# Patient Record
Sex: FEMALE | Race: WHITE
[De-identification: names, ages, dates, MRNs, and addresses within clinical notes are randomized per-mention and may not be internally consistent; named-entity substitution may affect disease eponyms.]

---

## 2018-01-08 ENCOUNTER — HOSPITAL ENCOUNTER (OUTPATIENT)
Dept: HOSPITAL 56 - MW.SDS | Age: 63
LOS: 1 days | Discharge: HOME | End: 2018-01-09
Attending: OBSTETRICS & GYNECOLOGY
Payer: COMMERCIAL

## 2018-01-08 DIAGNOSIS — D25.1: ICD-10-CM

## 2018-01-08 DIAGNOSIS — R11.0: ICD-10-CM

## 2018-01-08 DIAGNOSIS — E03.9: ICD-10-CM

## 2018-01-08 DIAGNOSIS — Z88.8: ICD-10-CM

## 2018-01-08 DIAGNOSIS — Z98.890: ICD-10-CM

## 2018-01-08 DIAGNOSIS — N72: ICD-10-CM

## 2018-01-08 DIAGNOSIS — Z87.442: ICD-10-CM

## 2018-01-08 DIAGNOSIS — Z79.899: ICD-10-CM

## 2018-01-08 DIAGNOSIS — Z90.89: ICD-10-CM

## 2018-01-08 DIAGNOSIS — Z88.2: ICD-10-CM

## 2018-01-08 DIAGNOSIS — K21.9: ICD-10-CM

## 2018-01-08 DIAGNOSIS — N81.2: Primary | ICD-10-CM

## 2018-01-08 LAB
CHLORIDE SERPL-SCNC: 108 MMOL/L (ref 98–110)
SODIUM SERPL-SCNC: 142 MMOL/L (ref 136–146)

## 2018-01-08 PROCEDURE — 80048 BASIC METABOLIC PNL TOTAL CA: CPT

## 2018-01-08 PROCEDURE — 86901 BLOOD TYPING SEROLOGIC RH(D): CPT

## 2018-01-08 PROCEDURE — 86900 BLOOD TYPING SEROLOGIC ABO: CPT

## 2018-01-08 PROCEDURE — 86850 RBC ANTIBODY SCREEN: CPT

## 2018-01-08 PROCEDURE — 85027 COMPLETE CBC AUTOMATED: CPT

## 2018-01-08 PROCEDURE — 58262 VAG HYST INCLUDING T/O: CPT

## 2018-01-08 PROCEDURE — 57200 REPAIR OF VAGINA: CPT

## 2018-01-08 PROCEDURE — 36415 COLL VENOUS BLD VENIPUNCTURE: CPT

## 2018-01-08 PROCEDURE — 85025 COMPLETE CBC W/AUTO DIFF WBC: CPT

## 2018-01-08 RX ADMIN — PROMETHAZINE HYDROCHLORIDE PRN MG: 25 INJECTION INTRAMUSCULAR; INTRAVENOUS at 15:04

## 2018-01-08 RX ADMIN — PROMETHAZINE HYDROCHLORIDE PRN MG: 25 INJECTION INTRAMUSCULAR; INTRAVENOUS at 22:45

## 2018-01-08 NOTE — PCM.OPNOTE
- General Post-Op/Procedure Note


Date of Surgery/Procedure: 01/08/18


Operative Procedure(s): TVH/BSO/A&P repair/cystoscopy


Findings: 





8 week uterus, normal appearing tubes/ovaries, bilateral patent ureters


Pre Op Diagnosis: Incomplete uterovaginal prolapse


Post-Op Diagnosis: Same


Anesthesia Technique: General ET Tube


Primary Surgeon: Shirley Fleming


Assistant: Lacey Daniels


Pathology: 





uterus, tubes, ovaries, vaginal mucosa


Fluid Replacement, Intraop: 2,700


EBL in mLs: 350


Complications: none known


Condition: Good


Free Text/Narrative:: 


 Intake & Output











 01/07/18 01/08/18 01/08/18





 22:59 06:59 14:59


 


Output Total   475


 


Balance   -475








Dictation  [id number]

## 2018-01-08 NOTE — PCM.PREANE
Preanesthetic Assessment





- Anesthesia/Transfusion/Family Hx


Anesthesia History: Prior Anesthesia Without Reaction


Family History of Anesthesia Reaction: No


Transfusion History: No Prior Transfusion(s)


Intubation History: Unknown





- Review of Systems


General: No Symptoms


Pulmonary: No Symptoms


Cardiovascular: No Symptoms


Gastrointestinal: No Symptoms


Neurological: No Symptoms


Other: Reports: None





- Physical Assessment


O2 Sat by Pulse Oximetry: 99


Respiratory Rate: 16


Vital Signs: 





 Last Vital Signs











Temp  36.5 C   18 06:57


 


Pulse  84   18 06:57


 


Resp  16   18 06:57


 


BP  130/62   18 06:57


 


Pulse Ox  99   18 06:57











Height: 1.65 m


Weight: 69.4 kg


ASA Class: 2


Mental Status: Alert & Oriented x3


Airway Class: Mallampati = 2


Dentition: Reports: Normal Dentition


Thyro-Mental Finger Breadths: 2


Mouth Opening Finger Breadths: 3


ROM/Head Extension: Full


Lungs: Clear to Auscultation, Normal Respiratory Effort


Cardiovascular: Regular Rate, Regular Rhythm





- Allergies


Allergies/Adverse Reactions: 


 Allergies











Allergy/AdvReac Type Severity Reaction Status Date / Time


 


propoxyphene [From Darvon] Allergy  Dizziness Verified 18 06:48


 


Sulfa (Sulfonamide Allergy  Rash Verified 18 06:48





Antibiotics)     














- Blood


Blood Available: No





- Anesthesia Plan


Pre-Op Medication Ordered: None





- Acknowledgements


Anesthesia Type Planned: General Anesthesia


Pt an Appropriate Candidate for the Planned Anesthesia: Yes


Alternatives and Risks of Anesthesia Discussed w Pt/Guardian: Yes


Pt/Guardian Understands and Agrees with Anesthesia Plan: Yes





PreAnesthesia Questionnaire


HEENT History: Reports: Cataract


Other HEENT History: wears glasses


Gastrointestinal History: Reports: GERD


Genitourinary History: Reports: Renal Calculus


OB/GYN History: Reports: Pregnancy, Spontaneous 


Musculoskeletal History: Reports: Fibromyalgia


Other Musculoskeletal History: osteopenia,  hx of fx right ankle


Endocrine/Metabolic History: Reports: Hypothyroidism





- Past Surgical History


HEENT Surgical History: Reports: Cataract Surgery, Retinal, Tonsillectomy


Other HEENT Surgeries/Procedures: retinal repair-right eye


Respiratory Surgical History: Reports: Lung Biopsies


Other Respiratory Surgeries/Procedures: non-malignant nodule removed from lung


GI Surgical History: Reports: Colonoscopy


Female  Surgical History: Reports: Lithotripsy/ESWL





- SUBSTANCE USE


Smoking Status *Q: Never Smoker


Recreational Drug Use History: No





- HOME MEDS


Home Medications: 


 Home Meds





Amitriptyline [Elavil] 10 mg PO BEDTIME 18 [History]


Cholecalciferol (Vitamin D3) [Vitamin D3] 2,000 unit PO BID 18 [History]


Estradiol [Yuvafem] 10 mcg VAG ASDIRECTED 18 [History]


Levothyroxine [Synthroid] 50 mcg PO QAM 18 [History]


Mv-Min/Iron/Folic/Calcium/Vitk [Women's Daily Formula Tablet] 1 tab PO DAILY  [History]


Pantoprazole Sodium [Protonix] 40 mg PO Q48H 18 [History]


Red Yeast Rice 1 cap PO DAILY 18 [History]











- CURRENT (IN HOUSE) MEDS


Current Meds: 





 Current Medications





Sodium Chloride (Saline Flush)  10 ml FLUSH ASDIRECTED PRN


   PRN Reason: Keep Vein Open


Sodium Chloride (Saline Flush)  2.5 ml FLUSH ASDIRECTED PRN


   PRN Reason: Keep Vein Open





Discontinued Medications





Cefazolin Sodium/Dextrose 1 gm (/ Premix)  50 mls @ 100 mls/hr IV ONETIME ONE


   Stop: 18 05:29

## 2018-01-08 NOTE — OR
SURGEON:

Shirley Fleming M.D.

 

DATE OF PROCEDURE:  01/08/2018

 

PREOPERATIVE DIAGNOSIS:

Incomplete uterovaginal prolapse.

 

POSTOPERATIVE DIAGNOSIS:

Incomplete uterovaginal prolapse.

 

PROCEDURE:

1. Total vaginal hysterectomy.

2. Bilateral salpingo-oophorectomy.

3. Anterior and posterior colporrhaphy with cystoscopy.

 

PRIMARY SURGEON:

Shirley Fleming M.D.

 

ASSISTANT:

Lacey Daniels M.D.

 

ANESTHESIA:

General endotracheal anesthesia.

 

FLUIDS:

2700 mL crystalloid.

 

ESTIMATED BLOOD LOSS:

350 mL.

 

FINDINGS:

Approximately 8-week size uterus.  Normal-appearing tubes and ovaries.

Bilateral patent ureters with visualization with cystoscopy at the end of the

case.

 

INDICATIONS:

Marie is a 62-year-old postmenopausal female, who has had ongoing difficulties

with pelvic organ prolapse.  At this time, she would like to proceed with

surgical intervention.  Risks of procedure have been discussed and proper

consent obtained.

 

PROCEDURE IN DETAIL:

The patient was taken to the operating room, where she underwent general

endotracheal anesthesia and was placed in a modified dorsal lithotomy position

and prepped and draped in the usual sterile fashion.  SCDs to the lower

extremities.  Mcrae to gravity.  She received Ancef prophylactically.  Time-out

was performed.

 

A weighted speculum was placed in the vagina, anterior [place].  Cervix grasped

with Allison clamp.  The cervix was circumscribed with Bovie cautery.  The

anterior and posterior overlying mucosa was dissected away from the underlying

peritoneum.  The peritoneum was tented downwards posteriorly and entered

sharply.  A longer weighted speculum was replaced with the shorter anteriorly.

Careful dissection was performed bluntly and sharply as the anterior cul-de-sac

was entered.  Alberto now mobilized the bladder away from the operative field.

 

Marisa clamps were utilized to secure the uterosacral ligament on either side.

Pedicle was transected and suture ligated with 2-0 Vicryl.  The remaining

pedicles along either side of the uterus were able to be secured, transected,

and suture ligated up to the level of the utero-tubo-ovarian pedicle which was

secured, transected, and suture ligated.  Specimens were passed off to scrub

tech to be sent to pathology.

 

Photographs were taken as the patient had requested.

 

The right fallopian tube and ovary were able to be identified, grasped with

[place] clamp, and the infundibulopelvic ligament was able to be secured with

Marisa clamp x2, transected, and suture ligated with suture x2.  Similar fashion

was performed on the patient's right side.  A small pedicle of tissue that was

still adherent was able to be grasped with a LigaSure, transected, and suture

ligated.

 

We inspected the pedicles closely.  There was an area of bleeding along the

right IFP that was secured with a suture.  Hemostasis was thereafter evident.

 

Remainder of the pedicles appeared hemostatic.

 

The vaginal apex on either side was secured to the uterosacral ligament on

either side in order to help prevent enterocele to form in the future.  The

culdoplasty was performed posteriorly beginning at the left uterosacral

ligament, reefing the posterior peritoneum, and incorporating the right

uterosacral ligament.  In the same fashion was performed one step more cephalad

using the right uterosacral ligament, reefing the posterior peritoneum, and

exiting through the left uterosacral ligament.

 

These will be tied down at the end of the case in order to help with

visualization for the remainder of the case.

 

Attention was now turned to performing the anterior repair.  The midline

anterior vaginal mucosa was grasped on either side with Allis clamps.  The

region was hydrodissected and then a sagittal midline vaginal mucosal incision

was created using Metzenbaum scissors.  The edges of the mucosa were grasped on

either side with serial Allis clamps.  The overlying mucosa was dissected

sharply and bluntly from the muscularis tissue until stronger muscularis tissue

was able to be identified on either side.  The stronger muscularis tissue was

now plicated using 2-0 Vicryl with inverted mattress suture technique.  The

excess mucosa was trimmed, and the mucosa was closed using 0 Vicryl in a

continuous running locked fashion down the level of the cuff.  Posteriorly, the

posterior defect was able to be identified.  The Allis clamps were secured on

either side of the introitus at 5 and 7 o'clock.  An inverted-V wedge section of

tissue was able to be excised using a #15 blade scalpel in order to help perform

perineorrhaphy.

 

In the midline posterior mucosa, hydrodissection was performed.  The Metzenbaum

scissor was now utilized to create a sagittal midline incision.  The edges of

the mucosa were now grasped in a serial fashion with Allis clamps, and overlying

mucosa was dissected from underlying muscularis in a similar fashion to the

anterior.  The stronger muscularis tissue was able to be identified laterally on

each side and re-plicated using 2-0 Vicryl with inverted mattress suture

technique.  There was 1 perforating vessel bleeding along the left side, secured

with a suture, and hemostasis was thereafter evident.

 

Excess vaginal mucosa was now trimmed.  The perineorrhaphy was repaired using 2-

0 Vicryl bringing together the subcutaneous tissue within and closing the skin

in a subcuticular fashion.  The remainder of the cuff was now closed using 0

Vicryl in a continuous running locked fashion after tying down the Álvarez

culdoplasty 2 sutures which did occlude the cul-de-sac nicely.

 

These sutures were trimmed as the cuff continued to be closed.  The Anesthesia

delivered IV fluorescein and Lasix.  The cuff was now inspected and was found to

be hemostatic.  Mcrae catheter was removed after the balloon was desufflated.

The cystoscope was introduced using normal saline as distention media.  Able to

visualize dome of the bladder, followed by the trigone.  The right ureteral

orifice followed by the left ureteral orifice was able to be visualized.

Fluorescein dyed urine was seen streaming from them helping to ensure ureteral

patency.  The cystoscope was removed.  The bladder was drained.  Mcrae catheter

was replaced.  The vaginal cuff was once again inspected and found to be

hemostatic.  The vaginal cuff was now packed with a packing coated with KY.

Packing was trimmed.  The patient tolerated the procedure well.  She will go to

PACU in stable condition.  Sponge and needle count was correct x2.  Specimens to

pathology.  Once again, we took photographs of the uterus and tubes and ovaries

per the patient request.

 

 

ABRAM / SHWETHA

DD:  01/08/2018 12:02:58

DT:  01/08/2018 18:38:09

Job #:  [id number]

## 2018-01-09 LAB
CHLORIDE SERPL-SCNC: 106 MMOL/L (ref 98–110)
SODIUM SERPL-SCNC: 139 MMOL/L (ref 136–146)

## 2018-01-09 RX ADMIN — PROMETHAZINE HYDROCHLORIDE PRN MG: 25 INJECTION INTRAMUSCULAR; INTRAVENOUS at 05:07

## 2018-01-09 NOTE — [id number]
Post Anesthesia Note





- EVALUATION WITHIN 48HRS OF ANESTHETIC


Vital Signs in Normal Range: Yes


Patient Participated in Evaluation: Yes


Respiratory Function Stable: Yes


Airway Patent: Yes


Cardiovascular Function Stable: Yes


Hydration Status Stable: Yes


Pain Control Satisfactory: Yes


Nausea and Vomiting Control Satisfactory: No (sleeping now but had nausea 

phenergan and scop patch given)


Mental Status Recovered: Yes

## 2018-01-09 NOTE — PCM.SURGPN
- General Info


Date of Service: 01/09/18


POD#: 1


Functional Status: Reports: Pain Controlled, Tolerating Diet, Ambulating





- Review of Systems


General: Denies: Fever, Weakness


Pulmonary: Denies: Shortness of Breath


Cardiovascular: Denies: Chest Pain, Palpitations, Lightheadedness


Gastrointestinal: Reports: Nausea (improved this am, ate breakfast).  Denies: 

Diarrhea


Genitourinary: Denies: Flank Pain


Psychiatric: Reports: No Symptoms





- Patient Data


Vitals - Most Recent: 


 Last Vital Signs











Temp  37.2 C   01/09/18 08:00


 


Pulse  98   01/09/18 08:00


 


Resp  18   01/09/18 08:00


 


BP  150/73 H  01/09/18 08:00


 


Pulse Ox  96   01/09/18 08:00











Weight - Most Recent: 69.4 kg


I&O - Last 24 Hours: 


 Intake & Output











 01/08/18 01/09/18 01/09/18





 22:59 06:59 14:59


 


Intake Total 389 1825 


 


Output Total 550 1900 


 


Balance -161 -75 











Lab Results Last 24 Hrs: 


 Laboratory Results - last 24 hr











  01/08/18 01/09/18 01/09/18 Range/Units





  07:12 04:52 04:52 


 


WBC   11.62 H   (4.0-11.0)  K/uL


 


RBC   4.01 L   (4.30-5.90)  M/uL


 


Hgb   12.2   (12.0-16.0)  g/dL


 


Hct   35.4 L   (36.0-46.0)  %


 


MCV   88.3   (80.0-98.0)  fL


 


MCH   30.4   (27.0-32.0)  pg


 


MCHC   34.5   (31.0-37.0)  g/dL


 


RDW Std Deviation   44.4   (28.0-62.0)  fl


 


RDW Coeff of [place]   14   (11.0-15.0)  %


 


Plt Count   279   (150-400)  K/uL


 


MPV   10.40   (7.40-12.00)  fL


 


Neut % (Auto)   79.8   (48.0-80.0)  %


 


Lymph % (Auto)   12.3 L   (16.0-40.0)  %


 


Mono % (Auto)   7.9   (0.0-15.0)  %


 


Eos % (Auto)   0.0   (0.0-7.0)  %


 


Baso % (Auto)   0.0   (0.0-1.5)  %


 


Neut # (Auto)   9.3 H   (1.4-5.7)  K/uL


 


Lymph # (Auto)   1.4   (0.6-2.4)  K/uL


 


Mono # (Auto)   0.9 H   (0.0-0.8)  K/uL


 


Eos # (Auto)   0.0   (0.0-0.7)  K/uL


 


Baso # (Auto)   0.0   (0.0-0.1)  K/uL


 


Nucleated RBC %   0.0   /100WBC


 


Nucleated RBCs #   0   K/uL


 


Sodium    139  (136-146)  mmol/L


 


Potassium    3.4 L  (3.5-5.1)  mmol/L


 


Chloride    106  ()  mmol/L


 


Carbon Dioxide    24  (21-31)  mmol/L


 


BUN    7  (6.0-23.0)  mg/dL


 


Creatinine    0.7  (0.6-1.5)  mg/dL


 


Est Cr Clr Drug Dosing    74.98  mL/min


 


Estimated GFR (MDRD)    > 60.0  ml/min


 


Glucose    115 H  ()  mg/dL


 


Calcium    8.4 L  (8.8-10.8)  mg/dL


 


Blood Type  A POSITIVE    


 


Antibody Screen  NEGATIVE    











Med Orders - Current: 


 Current Medications





Al Hydroxide/Mg Hydroxide (Mag-Al Plus)  30 ml PO Q4H PRN


   PRN Reason: Indigestion


Belladonna Alkaloids/Opium (B & O Supprettes No. 15a)  1 supp RECTAL Q4H PRN


   PRN Reason: Pain


Docusate Sodium (Colace)  100 mg PO BID [facility]


   Last Admin: 01/08/18 23:16 Dose:  100 mg


Fentanyl (Sublimaze)  50 mcg IVPUSH Q5M PRN


   PRN Reason: Pain (severe 7-10)


   Stop: 01/09/18 11:02


Dextrose/Sodium Chloride (Dextrose 5%-1/2 Ns)  1,000 mls @ 125 mls/hr IV 

ASDIRECTED [facility]


   Last Admin: 01/09/18 07:11 Dose:  125 mls/hr


Ketorolac Tromethamine (Toradol)  30 mg IVPUSH Q6H PRN


   PRN Reason: Pain (severe 7-10)


   Stop: 01/13/18 11:50


Morphine Sulfate (Morphine)  2 mg IVPUSH Q2H PRN


   PRN Reason: Pain (severe 7-10)


Morphine Sulfate (Morphine)  4 mg IVPUSH Q2H PRN


   PRN Reason: Pain (severe 7-10)


Ondansetron HCl (Zofran)  4 mg IVPUSH Q6H PRN


   PRN Reason: Nausea/Vomiting


   Last Admin: 01/08/18 17:14 Dose:  4 mg


Oxycodone/Acetaminophen (Percocet 325-5 Mg)  1 tab PO Q4H PRN


   PRN Reason: Pain (moderate 4-6)


Oxycodone/Acetaminophen (Percocet 325-5 Mg)  2 tab PO Q4H PRN


   PRN Reason: Pain (moderate 4-6)


Promethazine HCl (Phenergan)  25 mg IM Q6H PRN


   PRN Reason: Nausea/Vomiting


   Last Admin: 01/09/18 05:07 Dose:  25 mg


Scopolamine (Transderm-Scop)  1.5 mg TRDERM Q72H PRN


   PRN Reason: Nausea


   Last Admin: 01/08/18 17:52 Dose:  1.5 mg


Sodium Chloride (Saline Flush)  10 ml FLUSH ASDIRECTED PRN


   PRN Reason: Keep Vein Open


Sodium Chloride (Saline Flush)  2.5 ml FLUSH ASDIRECTED PRN


   PRN Reason: Keep Vein Open





Discontinued Medications





Dexamethasone (Dexamethasone) Confirm Administered Dose 20 mg .ROUTE .STK-MED 

ONE


   Stop: 01/08/18 07:55


Diphenhydramine HCl (Benadryl) Confirm Administered Dose 50 mg .ROUTE .STK-MED 

ONE


   Stop: 01/08/18 07:55


Fentanyl (Sublimaze) Confirm Administered Dose 250 mcg .ROUTE .STK-MED ONE


   Stop: 01/08/18 07:55


Fluorescein Sodium (Ak-Fluor) Confirm Administered Dose 5 ml .ROUTE .STK-MED ONE


   Stop: 01/08/18 10:20


Furosemide (Lasix) Confirm Administered Dose 40 mg .ROUTE .STK-MED ONE


   Stop: 01/08/18 10:20


Glycopyrrolate () Confirm Administered Dose 1 mg .ROUTE .STK-MED ONE


   Stop: 01/08/18 11:34


Hydromorphone HCl (Dilaudid) Confirm Administered Dose 2 mg .ROUTE .STK-MED ONE


   Stop: 01/08/18 10:18


Hydromorphone HCl (Dilaudid)  0 mg IVPUSH ONETIME ONE


   Stop: 01/08/18 11:04


   Last Admin: 01/08/18 14:24 Dose:  Not Given


Cefazolin Sodium/Dextrose 1 gm (/ Premix)  50 mls @ 100 mls/hr IV ONETIME ONE


   Stop: 01/08/18 05:29


   Last Admin: 01/08/18 14:24 Dose:  Not Given


Acetaminophen (Ofirmev) Confirm Administered Dose 100 mls @ as directed IV .STK-

MED ONE


   Stop: 01/08/18 07:56


Cefazolin Sodium/Dextrose (Ancef) Confirm Administered Dose 50 mls @ as 

directed .ROUTE .STK-MED ONE


   Stop: 01/08/18 09:55


Ketorolac Tromethamine (Toradol)  30 mg IVPUSH ONETIME ONE


   Stop: 01/08/18 11:51


   Last Admin: 01/08/18 14:25 Dose:  Not Given


Midazolam HCl (Versed 1 Mg/Ml) Confirm Administered Dose 2 mg .ROUTE .STK-MED 

ONE


   Stop: 01/08/18 07:55


Neostigmine Methylsulfate (Neostigmine) Confirm Administered Dose 5 mg .ROUTE 

.STK-MED ONE


   Stop: 01/08/18 11:34


Ondansetron HCl (Zofran) Confirm Administered Dose 4 mg .ROUTE .STK-MED ONE


   Stop: 01/08/18 07:55


Propofol (Diprivan  20 Ml) Confirm Administered Dose 200 mg .ROUTE .STK-MED ONE


   Stop: 01/08/18 07:55


Rocuronium Bromide (Zemuron) Confirm Administered Dose 100 mg .ROUTE .STK-MED 

ONE


   Stop: 01/08/18 07:55











- Exam


General: Alert, Oriented


Lungs: Normal Respiratory Effort


Cardiovascular: Regular Rate, Regular Rhythm


GI/Abdominal Exam: Soft, Non-Tender.  No: Guarding, Rigid


Extremities: No: Genaro's Sign


Psy/Mental Status: Alert, Normal Affect





- Problem List & Annotations


(1) Incomplete uterovaginal prolapse


SNOMED Code(s): 040999732


   Code(s): N81.2 - INCOMPLETE UTEROVAGINAL PROLAPSE   Status: Acute   Current 

Visit: Yes   





- Problem List Review


Problem List Initiated/Reviewed/Updated: Yes





- My Orders


Last 24 Hours: 


 Active Orders 24 hr











 Category Date Time Status


 


 Patient Status [ADT] Routine ADT  01/08/18 11:50 Active


 


 Antiembolic Devices [RC] Q4H Care  01/08/18 11:50 Active


 


 Notify Provider Intake and Out [RC] ASDIRECTED Care  01/08/18 11:50 Active


 


 Notify Provider Vital Signs [RC] ASDIRECTED Care  01/08/18 11:50 Active


 


 Oxygen Therapy [RC] ASDIRECTED Care  01/08/18 11:50 Active


 


 Pulse Oximetry [RC] PER UNIT ROUTINE Care  01/08/18 11:50 Active


 


 RT Incentive Spirometry [RC] Q2HWA Care  01/08/18 11:50 Active


 


 Up With Assistance [RC] PER UNIT ROUTINE Care  01/08/18 11:50 Active


 


 Up ad Jolene [RC] PER UNIT ROUTINE Care  01/08/18 11:50 Active


 


 Urinary Catheter Removal [RC] Per Unit Routine Care  01/08/18 11:50 Active


 


 Vital Signs [RC] Q4H Care  01/08/18 11:50 Active


 


 Regular Diet [DIET] Diet  01/08/18 Lunch Active


 


 Acetaminophen/oxyCODONE [Percocet 325-5 MG] Med  01/08/18 11:50 Active





 1 tab PO Q4H PRN   


 


 Acetaminophen/oxyCODONE [Percocet 325-5 MG] Med  01/08/18 11:50 Active





 2 tab PO Q4H PRN   


 


 Alum Hydrox/Mag Hydrox/Simeth [Mag-Al Plus] Med  01/08/18 11:50 Active





 30 ml PO Q4H PRN   


 


 Belladonna/Opium [B & O Supprettes No. 15A] Med  01/08/18 11:53 Active





 1 supp RECTAL Q4H PRN   


 


 Dextrose 5%-0.45% NaCl [Dextrose 5%-1/2 NS] 1,000 ml Med  01/08/18 12:00 Active





 IV ASDIRECTED   


 


 Docusate Sodium [Colace] Med  01/08/18 21:00 Active





 100 mg PO BID   


 


 Ketorolac [Toradol] Med  01/08/18 11:50 Active





 30 mg IVPUSH Q6H PRN   


 


 Morphine Med  01/08/18 11:50 Active





 2 mg IVPUSH Q2H PRN   


 


 Morphine Med  01/08/18 11:50 Active





 4 mg IVPUSH Q2H PRN   


 


 Ondansetron [Zofran] Med  01/08/18 11:50 Active





 4 mg IVPUSH Q6H PRN   


 


 Promethazine [Phenergan] Med  01/08/18 11:50 Active





 25 mg IM Q6H PRN   


 


 Scopolamine [Transderm-Scop] Med  01/08/18 17:36 Active





 1.5 mg TRDERM Q72H PRN   


 


 fentaNYL [Sublimaze] Med  01/08/18 11:02 Active





 50 mcg IVPUSH Q5M PRN   


 


 Heat Therapy [OM.PC] Routine Oth  01/08/18 17:36 Ordered


 


 Peripheral IV Discontinue [OM.PC] Routine Oth  01/08/18 11:50 Ordered


 


 Sequential Compression Device [OM.PC] Per Unit Routine Oth  01/08/18 11:50 

Ordered


 


 Resuscitation Status Routine Resus Stat  01/08/18 11:50 Ordered








 Medication Orders





Al Hydroxide/Mg Hydroxide (Mag-Al Plus)  30 ml PO Q4H PRN


   PRN Reason: Indigestion


Belladonna Alkaloids/Opium (B & O Supprettes No. 15a)  1 supp RECTAL Q4H PRN


   PRN Reason: Pain


Docusate Sodium (Colace)  100 mg PO BID [facility]


   Last Admin: 01/08/18 23:16  Dose: 100 mg


Fentanyl (Sublimaze)  50 mcg IVPUSH Q5M PRN


   PRN Reason: Pain (severe 7-10)


   Stop: 01/09/18 11:02


Dextrose/Sodium Chloride (Dextrose 5%-1/2 Ns)  1,000 mls @ 125 mls/hr IV 

ASDIRECTED [facility]


   Last Admin: 01/09/18 07:11  Dose: 125 mls/hr


   Infusion: 01/09/18 06:45  Dose: 125 mls/hr


   Admin: 01/08/18 22:45  Dose: 125 mls/hr


   Infusion: 01/08/18 22:31  Dose: 125 mls/hr


   Admin: 01/08/18 14:31  Dose: 125 mls/hr


Ketorolac Tromethamine (Toradol)  30 mg IVPUSH Q6H PRN


   PRN Reason: Pain (severe 7-10)


   Stop: 01/13/18 11:50


Morphine Sulfate (Morphine)  2 mg IVPUSH Q2H PRN


   PRN Reason: Pain (severe 7-10)


Morphine Sulfate (Morphine)  4 mg IVPUSH Q2H PRN


   PRN Reason: Pain (severe 7-10)


Ondansetron HCl (Zofran)  4 mg IVPUSH Q6H PRN


   PRN Reason: Nausea/Vomiting


   Last Admin: 01/08/18 17:14  Dose: 4 mg


Oxycodone/Acetaminophen (Percocet 325-5 Mg)  1 tab PO Q4H PRN


   PRN Reason: Pain (moderate 4-6)


Oxycodone/Acetaminophen (Percocet 325-5 Mg)  2 tab PO Q4H PRN


   PRN Reason: Pain (moderate 4-6)


Promethazine HCl (Phenergan)  25 mg IM Q6H PRN


   PRN Reason: Nausea/Vomiting


   Last Admin: 01/09/18 05:07  Dose: 25 mg


   Admin: 01/08/18 22:45  Dose: 25 mg


   Admin: 01/08/18 15:04  Dose: 25 mg


Scopolamine (Transderm-Scop)  1.5 mg TRDERM Q72H PRN


   PRN Reason: Nausea


   Last Admin: 01/08/18 17:52  Dose: 1.5 mg


Sodium Chloride (Saline Flush)  10 ml FLUSH ASDIRECTED PRN


   PRN Reason: Keep Vein Open


Sodium Chloride (Saline Flush)  2.5 ml FLUSH ASDIRECTED PRN


   PRN Reason: Keep Vein Open











- Assessment


Assessment           (Free Text/Narrative):: 





POD 1 status post TVH/BSO/A&P repair





- Plan


Plan                        (Free Text/Narrative):: 





Vaginal packing removed, huertas catheter removed.  Ambulate this morning.Labs 

are reassuring.  Once able to void, will allow discharge to home. Pain is well 

controlled, has not required any oral narcotic yet.  Discharge instructions 

reviewed. Infection and bleeding warnings reviewed. Follow up at [facility] 2 and 6 

weeks.